# Patient Record
(demographics unavailable — no encounter records)

---

## 2024-10-24 NOTE — ADDENDUM
[FreeTextEntry1] : I, Moises Clinton, acted as a scribe on behalf of Dr. Raghav Stacy MD, on 10/24/2024.   All medical entries made by the scribe were at my, Dr. Raghav Stacy MD, direction and personally dictated by me on 10/24/2024. I have reviewed the chart and agree that the record accurately reflects my personal performance of the history, physical exam, assessment and plan. I have also personally directed, reviewed, and agreed with the chart.

## 2024-10-24 NOTE — REASON FOR VISIT
[Annual Wellness Visit] : an annual wellness visit [FreeTextEntry1] : subsequent Medicare Wellness Visit

## 2024-10-24 NOTE — HISTORY OF PRESENT ILLNESS
[Spouse] : spouse [FreeTextEntry1] : Patient is present today for a subsequent Annual Wellness Visit. [de-identified] : Pt is a 77 yr old male who is present today for a subsequent Annual Wellness Visit.   Patient reports visit with hospital, psychiatrist reports schizophrenia. Reports troubling sleeping because of new medications, reports dry mouth and shaking. Pt reports of memory being better. Started taking mirtazapine felt jittery. Following with [facility]. Reports taking melatonin and sleeping 8 hours last night. Reports BP being high recently. Reports keeping up with Losartan-saw cardiologist. Discussed follow up in 3 months. Discussed speaking to psychiatrist about Lexapro. Confirms driving at times around neighborhood.    Denies any CP, chest tightness or SOB. Denies any abdominal pain, urinary symptom, or change in bowel habits. Denies any fever, chills, or night sweats.

## 2024-10-24 NOTE — ASSESSMENT
[FreeTextEntry1] :  Annual Physical Exam: Coronary Heart Disease, DM II - BP is slightly high. Continue current management. - Check A1c, lipid panels, vitamin levels, urine analysis, TSH, PSA Total -advised to discuss with psychiatry about lexapro-started losartan yesterday-monitor BP at home      - RTO annually or as needed.   Pt verbalized understanding and will reach should any questions/concerns occur.

## 2024-10-24 NOTE — HEALTH RISK ASSESSMENT
[Good] : ~his/her~  mood as  good [Never (0 pts)] : Never (0 points) [No] : In the past 12 months have you used drugs other than those required for medical reasons? No [No falls in past year] : Patient reported no falls in the past year [0] : 2) Feeling down, depressed, or hopeless: Not at all (0) [Former] : Former [NO] : No [Retired] : retired [Reports normal functional visual acuity (ie: able to read med bottle)] : Reports normal functional visual acuity [Smoke Detector] : smoke detector [Carbon Monoxide Detector] : carbon monoxide detector [Safety elements used in home] : safety elements used in home [Seat Belt] :  uses seat belt [With Significant Other] : lives with significant other [] :  [FreeTextEntry1] : health maintenance [LowDoseCTScan] : n/a [EyeExamDate] : n/a [Change in mental status noted] : No change in mental status noted [Language] : denies difficulty with language [Behavior] : denies difficulty with behavior [Learning/Retaining New Information] : denies difficulty learning/retaining new information [Handling Complex Tasks] : denies difficulty handling complex tasks [Reasoning] : denies difficulty with reasoning [Spatial Ability and Orientation] : denies difficulty with spatial ability and orientation [Sexually Active] : not sexually active [Reports changes in hearing] : Reports no changes in hearing [Reports changes in vision] : Reports no changes in vision [Reports changes in dental health] : Reports no changes in dental health [Sunscreen] : does not use sunscreen [Travel to Developing Areas] : does not  travel to developing areas [TB Exposure] : is not being exposed to tuberculosis [Caregiver Concerns] : does not have caregiver concerns [BoneDensityComments] : n/a [ColonoscopyComments] : n/a [de-identified] : walking with cane

## 2024-11-01 NOTE — HEALTH RISK ASSESSMENT
[No] : In the past 12 months have you used drugs other than those required for medical reasons? No [0] : 2) Feeling down, depressed, or hopeless: Not at all (0) [Former] : Former

## 2024-11-02 NOTE — ADDENDUM
[FreeTextEntry1] : I, Moises Clinton, acted as a scribe on behalf of Dr. Raghav Stacy MD, on 11/01/2024.   All medical entries made by the scribe were at my, Dr. Raghav Stacy MD, direction and personally dictated by me on 11/01/2024. I have reviewed the chart and agree that the record accurately reflects my personal performance of the history, physical exam, assessment and plan. I have also personally directed, reviewed, and agreed with the chart.

## 2024-11-02 NOTE — ASSESSMENT
[FreeTextEntry1] : Follow up visit: Anemia mild -BP is stable. Continue current management. - Check CBC Ferritin, Iron with Total Binding Capacity, Immunofixation, Immunoglobulins, Lactate Dehydrogenase, Transferrin, Protein Electrophoresis, Transferrin,  -discussed lexapro safe to take for anxiety. Risperidone given for augmentation and sleep. Can take low dose and titrate upwards. Previous sodium levels were normal.    - RTO in 3 months     Pt verbalized understanding and will reach should any questions/concerns occur.

## 2024-11-02 NOTE — HISTORY OF PRESENT ILLNESS
[FreeTextEntry1] : Patient is present today for a follow up visit. [de-identified] : Pt is a 77yr old male who is present today for a follow up visit.  Patient presents to the office for anemia and .discuss medications. Saw psychiatry and was given lexapro and risperidone day and night. Attempted mirtazapine had reaction. Has not started lexapro. Denies any chest pain abdominal pain shortness of breath

## 2024-11-02 NOTE — HISTORY OF PRESENT ILLNESS
[FreeTextEntry1] : Patient is present today for a follow up visit. [de-identified] : Pt is a 77yr old male who is present today for a follow up visit.  Patient presents to the office for anemia and .discuss medications. Saw psychiatry and was given lexapro and risperidone day and night. Attempted mirtazapine had reaction. Has not started lexapro. Denies any chest pain abdominal pain shortness of breath

## 2024-12-17 NOTE — END OF VISIT
[FreeTextEntry4] : This note was written by Cami Marroquin on 12/17/2024 actively solely Colin Jones M.D. I, Cami Marroquin, am scribing for and in the presence of Colin Jones M.D. in the following sections HISTORY OF PRESENT ILLNESS, PAST MEDICAL/FAMILY/SOCIAL HISTORY; REVIEW OF SYSTEMS; VITAL SIGNS; PHYSICAL EXAM; ASSESSMENT/PLAN. All medical record entries made by this scribe at , Colin Jones M.D. direction and personally dictated by me on 12/17/2024. I personally performed the services described in the documentation, reviewed the documentation recorded by the scribe in my presence, and it accurately and completely records my words and actions.

## 2024-12-17 NOTE — HISTORY OF PRESENT ILLNESS
[FreeTextEntry1] : 04/14/2023 cc blood in semen  74 yo male c/o episode of blood in semen  voidng well  poor erections libido intact  12/17/2024 cc gross hematuria Pt is a 77 year old male presenting for a f/u visit with c/o gross hematuria. Pt reports noting blood in his urine about one week ago. This persisted for about 2-3 days and has stopped since then. Pt reports that currently his urination is fine and he denies dysuria. Pt reports being an ex-smoker for several years and quit in 2017. Pt denies a history of kidney stones and denies a family history of kidney cancer.

## 2024-12-17 NOTE — ASSESSMENT
[FreeTextEntry1] : Pt is a 77 year old male with gross hematuria.  We discussed  hematuria today and the multiple potential reasons for its presence. I discussed both benign and malignant etiologies that may explain hematuria. I've also reviewed the workup it is generally recommended for evaluation of  hematuria with the purposes of ruling out malignancy or other urinary tract pathology that could warrant intervention. I've explained that cystoscopy would be recommended and the reasons for it as well as the risks of bleeding infection and damage to urethra A catscan with and without contrast was ordered and discussed with patient Pt will follow up to review the at scan and for the cystoscopy BMP drawn today  UA and culture done today Urine cytology ordered today CT Urogram w/wo cont ordered today    Patient is being seen today for evaluation and management of a chronic and longitudinal ongoing condition and I am of the primary treating physician.

## 2024-12-17 NOTE — REVIEW OF SYSTEMS
Reduce your trileptal dose to 1 pill in the morning and 1 evening for 2 weeks, then 1 pill in the evening for 2 weeks, then stop the drug.  If you either 1) experience recurrence of your facial pain, OR 2) Continue to have problems with dizziness after standing, even after stopping the Trileptal, please contact us.    Thank you!       [see HPI] : see HPI [Negative] : Heme/Lymph

## 2025-01-23 NOTE — HISTORY OF PRESENT ILLNESS
[(Patient denies any chest pain, claudication, dyspnea on exertion, orthopnea, palpitations or syncope)] : Patient denies any chest pain, claudication, dyspnea on exertion, orthopnea, palpitations or syncope [Moderate (4-6 METs)] : Moderate (4-6 METs) [FreeTextEntry3] : Dr. Cormier [FreeTextEntry2] : 02/03/25 [FreeTextEntry4] : Patient is a 77yr old male who is present today for medical clearance.   Pt reports no known allergies to anesthesia, Hx of complications, or Hx of difficult to control bleeding. Pt made aware to stop NSAIDs and omega-3s one week prior to procedure. Pt reports able to walk up two flights of steps without SOB. Has appointment with cardiology after this appointment today.  Requesting movement disorder specialist for hand tremor.

## 2025-01-23 NOTE — ADDENDUM
[FreeTextEntry1] : I, Moises Clinton, acted as a scribe on behalf of Dr. Raghav Stacy MD, on 01/23/2025.   All medical entries made by the scribe were at my, Dr. Raghav Stacy MD, direction and personally dictated by me on 01/23/2025. I have reviewed the chart and agree that the record accurately reflects my personal performance of the history, physical exam, assessment and plan. I have also personally directed, reviewed, and agreed with the chart.

## 2025-01-23 NOTE — ASSESSMENT
[Patient Optimized for Surgery] : Patient optimized for surgery [FreeTextEntry4] : Medical clearance visit: -BP is stable. Continue current management. - RTO in 3 months or as needed.   -d/c eliquis 48 hours prior to procedure restart 24 hours after.  Dr. Zhang neuro consult for patient's concern of Parkinsonism Pt verbalized understanding and will reach should any questions/concerns occur.

## 2025-02-18 NOTE — PHYSICAL EXAM
[Normal Appearance] : normal appearance [Well Groomed] : well groomed [General Appearance - In No Acute Distress] : no acute distress [Edema] : no peripheral edema [Respiration, Rhythm And Depth] : normal respiratory rhythm and effort [Exaggerated Use Of Accessory Muscles For Inspiration] : no accessory muscle use [Abdomen Soft] : soft [Abdomen Tenderness] : non-tender [Costovertebral Angle Tenderness] : no ~M costovertebral angle tenderness [Urinary Bladder Findings] : the bladder was normal on palpation [Normal Station and Gait] : the gait and station were normal for the patient's age [] : no rash [No Focal Deficits] : no focal deficits [Oriented To Time, Place, And Person] : oriented to person, place, and time [Affect] : the affect was normal [Mood] : the mood was normal [No Palpable Adenopathy] : no palpable adenopathy [de-identified] : rodriguez clear urine

## 2025-02-18 NOTE — ASSESSMENT
[FreeTextEntry1] : passed tov  reviewed path  surveillance cysto in 3 months    Patient is being seen today for evaluation and management of a chronic and longitudinal ongoing condition and I am of the primary treating physician.

## 2025-02-18 NOTE — PHYSICAL EXAM
[Normal Appearance] : normal appearance [Well Groomed] : well groomed [General Appearance - In No Acute Distress] : no acute distress [Edema] : no peripheral edema [Respiration, Rhythm And Depth] : normal respiratory rhythm and effort [Exaggerated Use Of Accessory Muscles For Inspiration] : no accessory muscle use [Abdomen Soft] : soft [Abdomen Tenderness] : non-tender [Costovertebral Angle Tenderness] : no ~M costovertebral angle tenderness [Urinary Bladder Findings] : the bladder was normal on palpation [Normal Station and Gait] : the gait and station were normal for the patient's age [] : no rash [No Focal Deficits] : no focal deficits [Oriented To Time, Place, And Person] : oriented to person, place, and time [Affect] : the affect was normal [Mood] : the mood was normal [No Palpable Adenopathy] : no palpable adenopathy [de-identified] : rodriguez clear urine

## 2025-02-18 NOTE — HISTORY OF PRESENT ILLNESS
[FreeTextEntry1] : 04/14/2023 cc blood in semen  76 yo male c/o episode of blood in semen  voidng well  poor erections libido intact  12/17/2024 cc gross hematuria Pt is a 77 year old male presenting for a f/u visit with c/o gross hematuria. Pt reports noting blood in his urine about one week ago. This persisted for about 2-3 days and has stopped since then. Pt reports that currently his urination is fine and he denies dysuria. Pt reports being an ex-smoker for several years and quit in 2017. Pt denies a history of kidney stones and denies a family history of kidney cancer.  02/13/2025 cc TOV Pt is a 77 year old male presenting for TOV. doing well

## 2025-02-18 NOTE — END OF VISIT
[FreeTextEntry4] : This note was written by Cami Marroquin on 02/13/2025 actively solely Colin Jones M.D. I, Cami Marroquin, am scribing for and in the presence of Colin Jones M.D. in the following sections HISTORY OF PRESENT ILLNESS, PAST MEDICAL/FAMILY/SOCIAL HISTORY; REVIEW OF SYSTEMS; VITAL SIGNS; PHYSICAL EXAM; ASSESSMENT/PLAN. All medical record entries made by this scribe at , Colin Jones M.D. direction and personally dictated by me on 02/13/2025. I personally performed the services described in the documentation, reviewed the documentation recorded by the scribe in my presence, and it accurately and completely records my words and actions.

## 2025-03-24 NOTE — CARDIOLOGY SUMMARY
[de-identified] : 2/25/2025:  1. Left ventricular cavity is small. Left ventricular wall thickness is normal. Left ventricular systolic function is normal with an ejection fraction of 62 % by Vázquez's method of disks. There are no regional wall motion abnormalities seen.  2. There is mild (grade 1) left ventricular diastolic dysfunction.  3. Enlarged right ventricular cavity size, with normal wall thickness, and normal right ventricular systolic function. Tricuspid annular plane systolic excursion (TAPSE) is 1.7 cm (normal >=1.7 cm).  4. Normal left and right atrial size.  5. Moderate mitral regurgitation.  6. Mild tricuspid regurgitation.  7. Estimated pulmonary artery systolic pressure is 40 mmHg, consistent with mild pulmonary hypertension.  8. No pericardial effusion seen.  9. No prior echocardiogram is available for comparison.

## 2025-03-24 NOTE — DISCUSSION/SUMMARY
[FreeTextEntry1] : In summary, this is a 77-year-old man with HTN, HLD, CAD s/p CABG x4 (2017, LIMA-LAD, SVG-PDA, Cx, D1) with ASD closure, A fib on Eliquis, Mobitz II, and SND s/p PPM placement on 3/9/2025.  Mr. Dee appeared to understand the whole discussion and verbalized that all of his questions were answered to his satisfaction.  Thank you for allowing me to be involved in the care of this pleasant man. Please feel free to contact me with any questions.

## 2025-03-24 NOTE — HISTORY OF PRESENT ILLNESS
[FreeTextEntry1] : Referring Physician: Harman Mcdaniel MD   Dear Dr. Mcdaniel:    was seen in the [facility]iology Clinic today. For our records, please allow me to summarize the history and my findings.   This pleasant 77-year-old man has a history significant for HTN, HLD, CAD s/p CABG x4 (2017, LIMA-LAD, SVG-PDA, Cx, D1) with ASD closure, A fib on Eliquis, Mobitz II, and SND s/p PPM placement on 3/9/2025. He was hospitalized at [facility] in Feb of 2025. He was noted on tele to have sinus pauses and Mobitz II, all of which were symptomatic. He underwent MDT PPM placement.       Mr. Dee denies any recent history of chest pain, shortness of breath, palpitations, dizziness, or syncope.

## 2025-04-24 NOTE — DISCUSSION/SUMMARY
[FreeTextEntry1] : 77-year-old gentleman DM, HTN, HLD, CAD s/p 4V CABG with Mobitz II  #1 CAD- LIMA-LAD, SVG-PDA, Cx,D1 and ASD closure NYQ 10/2017, c/w aspirin, echo EF=50% Last stress test in 2023 was negative for ischemia. Will repeat later this year.  #2 HTN- c/w losartan, chlorthalidone #3 EP- s/p PPM, missed EP appointment will re-arrange.  #3 HLD- c/w atorvastatin #4 DM- as per PCP   Billing Statement: ECG obtained in the diagnosis and treatment of assessed problems. Total time spent on this encounter was 60 minutes. This includes non-face-to-face reviewing relevant portions of the patient's medical records prior to visit as well as time spent in completing documentation and coordination of care. During face-to-face time, I obtained medical history, examined the patient and discussed cardiovascular assessment and plan. Extensive review of medical records that patient brought with them from previous providers.

## 2025-04-24 NOTE — REASON FOR VISIT
[FreeTextEntry1] : PCP/Referring Doctor: Chief Complaint: "HFU " ------------------------------------------------------------------------------------------------------------------------------------ History of Present Illness:   77M Paroxysmal, AFIB, HTN, HLD, CAD, bladder cancer, s/p PPM placement  Did not follow up with EP post discharged. Missed appointment PAfib- in NSR  CAD - no angina  Bladder cancer- hematuria resolved, back on Eliquis  HTN- stable.      ROS:  chest pain (-), dyspnea with exertion (-), orthopnea (-), edema (-), palpitations (-), dizziness (-) All other systems were reviewed and are negative. ------------------------------------------------------------------------------------------------------------------------------------ Past Medical History: Any history of HTN? Yes  Any history of Lipid disorders? TG 58 mg/dl  LDL 48 mg/dl  Any history Diabetes or Prediabetes? Yes HbA1c 6.1% Any history of MI, stroke or TIA? Yes 7 Years  Any prior Stress Testing? Yes Date Unknown  Any prior Cath/Angiogram procedure?  Yes  Any prior Echocardiogram (TTE)? Yes 2025 Any prior vascular study? No  Have patient been on blood thinners? Yes  Have patient had cardiac or vascular surgeries? Yes March 2025 ------------------------------------------------------------------------------------------------------------------------------------ Patient's current cardiovascular medications were reviewed and updated in chart. ------------------------------------------------------------------------------------------------------------------------------------ Family history Do you have a family history of heart attacks and/or stroke before the age of 60? No Do you have a family history of cardiac arrest? Unknown  ------------------------------------------------------------------------------------------------------------------------------------ Social History: Do you currently or previously used tobacco including cigarettes and vapes? Ex Smoker  How many alcoholic drinks per week? 0 What is your occupation? Retired  ------------------------------------------------------------------------------------------------------------------------------------ Physical Exam: General appearance: NAD Lungs: CTAB CV: RRR Extremities: No peripheral edema.

## 2025-07-01 NOTE — PHYSICAL EXAM
[Normal Appearance] : normal appearance [Well Groomed] : well groomed [General Appearance - In No Acute Distress] : no acute distress [Edema] : no peripheral edema [Respiration, Rhythm And Depth] : normal respiratory rhythm and effort [Exaggerated Use Of Accessory Muscles For Inspiration] : no accessory muscle use [Abdomen Soft] : soft [Abdomen Tenderness] : non-tender [Costovertebral Angle Tenderness] : no ~M costovertebral angle tenderness [Urinary Bladder Findings] : the bladder was normal on palpation [Normal Station and Gait] : the gait and station were normal for the patient's age [] : no rash [No Focal Deficits] : no focal deficits [Oriented To Time, Place, And Person] : oriented to person, place, and time [Affect] : the affect was normal [Mood] : the mood was normal [No Palpable Adenopathy] : no palpable adenopathy [de-identified] : rodriguez clear urine

## 2025-07-01 NOTE — ASSESSMENT
[FreeTextEntry1] : passed tov  call for difficulty voidng  path reviewed  recurrent low grade noninvasiuve bladder ca  will do course of mitomycin risk and benefits reviewed     Patient is being seen today for evaluation and management of a chronic and longitudinal ongoing condition and I am of the primary treating physician.

## 2025-07-16 NOTE — DISCUSSION/SUMMARY
[FreeTextEntry1] : In summary, this is a 77 year old man with  hypertension, hyperlipidemia, CAD status post CABG, ASD status post closure, PAF, second degree AV block and SND s/p [employer]tr[employer] PPM in March of 2025.  Mr. Dee appeared to understand the whole discussion and verbalized that all of his questions were answered to his satisfaction.  Thank you for allowing me to be involved in the care of this pleasant man. Please feel free to contact me with any questions.

## 2025-07-16 NOTE — CARDIOLOGY SUMMARY
[de-identified] : 7/16/2025: [de-identified] : 2/5/2025: 1. Left ventricular cavity is small. Left ventricular wall thickness is normal. Left ventricular systolic function is normal with an ejection fraction of 62 % by Vázquez's method of disks. There are no regional wall motion abnormalities seen.  2. There is mild (grade 1) left ventricular diastolic dysfunction.  3. Enlarged right ventricular cavity size, with normal wall thickness, and normal right ventricular systolic function. Tricuspid annular plane systolic excursion (TAPSE) is 1.7 cm (normal >=1.7 cm).  4. Normal left and right atrial size.  5. Moderate mitral regurgitation.  6. Mild tricuspid regurgitation.  7. Estimated pulmonary artery systolic pressure is 40 mmHg, consistent with mild pulmonary hypertension.  8. No pericardial effusion seen.  9. No prior echocardiogram is available for comparison.

## 2025-07-16 NOTE — HISTORY OF PRESENT ILLNESS
[FreeTextEntry1] : Referring Physician: Harman Mcdaniel MD   Dear Dr. Mcdaniel:   Mr. Dee was seen in the [facility] Electrophysiology Clinic today. For our records, please allow me to summarize the history and my findings.   This pleasant 77 year old man has a history significant for hypertension, hyperlipidemia, CAD status post CABG, ASD status post closure, PAF, second degree AV block and SND s/p Medtr[employer] PPM in March of 2025.   Mr. Dee denies any recent history of chest pain, shortness of breath, palpitations, dizziness, or syncope.
